# Patient Record
Sex: FEMALE | Race: WHITE
[De-identification: names, ages, dates, MRNs, and addresses within clinical notes are randomized per-mention and may not be internally consistent; named-entity substitution may affect disease eponyms.]

---
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
## 2022-01-07 ENCOUNTER — HOSPITAL ENCOUNTER (EMERGENCY)
Dept: HOSPITAL 35 - ER | Age: 22
Discharge: HOME | End: 2022-01-07
Payer: COMMERCIAL

## 2022-01-07 VITALS — DIASTOLIC BLOOD PRESSURE: 59 MMHG | SYSTOLIC BLOOD PRESSURE: 104 MMHG

## 2022-01-07 VITALS — HEIGHT: 62 IN | WEIGHT: 148 LBS | BODY MASS INDEX: 27.23 KG/M2

## 2022-01-07 DIAGNOSIS — Z20.822: ICD-10-CM

## 2022-01-07 DIAGNOSIS — Z79.899: ICD-10-CM

## 2022-01-07 DIAGNOSIS — Z91.09: ICD-10-CM

## 2022-01-07 DIAGNOSIS — Z79.891: ICD-10-CM

## 2022-01-07 DIAGNOSIS — I10: ICD-10-CM

## 2022-01-07 DIAGNOSIS — Z88.6: ICD-10-CM

## 2022-01-07 DIAGNOSIS — Z88.1: ICD-10-CM

## 2022-01-07 DIAGNOSIS — Z79.82: ICD-10-CM

## 2022-01-07 DIAGNOSIS — Z88.5: ICD-10-CM

## 2022-01-07 DIAGNOSIS — Z88.4: ICD-10-CM

## 2022-01-07 DIAGNOSIS — M54.9: ICD-10-CM

## 2022-01-07 DIAGNOSIS — F41.9: ICD-10-CM

## 2022-01-07 DIAGNOSIS — R10.817: Primary | ICD-10-CM

## 2022-01-07 DIAGNOSIS — Z88.8: ICD-10-CM

## 2022-01-07 DIAGNOSIS — Z86.718: ICD-10-CM

## 2022-01-07 DIAGNOSIS — K74.60: ICD-10-CM

## 2022-01-07 DIAGNOSIS — G62.9: ICD-10-CM

## 2022-01-07 DIAGNOSIS — I42.9: ICD-10-CM

## 2022-01-07 DIAGNOSIS — F32.9: ICD-10-CM

## 2022-01-07 DIAGNOSIS — E03.9: ICD-10-CM

## 2022-01-07 DIAGNOSIS — Z91.018: ICD-10-CM

## 2022-01-07 LAB
%HYPO/RBC NFR BLD AUTO: (no result) %
ALBUMIN SERPL-MCNC: 2.8 G/DL (ref 3.4–5)
ALT SERPL-CCNC: 33 U/L (ref 14–59)
ANION GAP SERPL CALC-SCNC: 9 MMOL/L (ref 7–16)
ANISOCYTOSIS BLD QL SMEAR: (no result)
AST SERPL-CCNC: 36 U/L (ref 15–37)
BASOPHILS NFR BLD AUTO: 1.6 % (ref 0–2)
BILIRUB SERPL-MCNC: 1.4 MG/DL (ref 0.2–1)
BILIRUB UR-MCNC: NEGATIVE MG/DL
BUN SERPL-MCNC: 24 MG/DL (ref 7–18)
CALCIUM SERPL-MCNC: 8.4 MG/DL (ref 8.5–10.1)
CHLORIDE SERPL-SCNC: 96 MMOL/L (ref 98–107)
CO2 SERPL-SCNC: 27 MMOL/L (ref 21–32)
COLOR UR: YELLOW
CREAT SERPL-MCNC: 1.1 MG/DL (ref 0.6–1)
D DIMER PPP FEU-MCNC: 5.25 UG/MLFEU (ref 0.19–0.5)
EOSINOPHIL NFR BLD: 2.8 % (ref 0–3)
ERYTHROCYTE [DISTWIDTH] IN BLOOD BY AUTOMATED COUNT: 24.6 % (ref 10.5–14.5)
GLUCOSE SERPL-MCNC: 143 MG/DL (ref 74–106)
GRANULOCYTES NFR BLD MANUAL: 77.7 % (ref 36–66)
HCT VFR BLD CALC: 37.3 % (ref 37–47)
HGB BLD-MCNC: 11.1 GM/DL (ref 12–15)
INR PPP: 1.61
KETONES UR STRIP-MCNC: NEGATIVE MG/DL
LYMPHOCYTES NFR BLD AUTO: 11.9 % (ref 24–44)
MCH RBC QN AUTO: 20.8 PG (ref 26–34)
MCHC RBC AUTO-ENTMCNC: 29.8 G/DL (ref 28–37)
MCV RBC: 69.8 FL (ref 80–100)
MICROCYTES: (no result)
MONOCYTES NFR BLD: 6 % (ref 1–8)
NEUTROPHILS # BLD: 5.8 THOU/UL (ref 1.4–8.2)
PLATELET # BLD EST: NORMAL 10*3/UL
PLATELET # BLD: 234 THOU/UL (ref 150–400)
POTASSIUM SERPL-SCNC: 3.2 MMOL/L (ref 3.5–5.1)
PROT SERPL-MCNC: 6.3 G/DL (ref 6.4–8.2)
PROTHROMBIN TIME: 17.2 SECONDS (ref 10.5–12.1)
RBC # BLD AUTO: 5.34 MIL/UL (ref 4.2–5)
RBC # UR STRIP: NEGATIVE /UL
SODIUM SERPL-SCNC: 132 MMOL/L (ref 136–145)
SP GR UR STRIP: 1.01 (ref 1–1.03)
URINE CLARITY: CLEAR
URINE GLUCOSE-RANDOM*: NEGATIVE
URINE LEUKOCYTES-REFLEX: (no result)
URINE NITRITE-REFLEX: NEGATIVE
URINE PROTEIN (DIPSTICK): NEGATIVE
UROBILINOGEN UR STRIP-ACNC: 0.2 E.U./DL (ref 0.2–1)
WBC # BLD AUTO: 7.5 THOU/UL (ref 4–11)

## 2022-01-10 NOTE — EKG
[address] [employer]
[place]  [address]
Phone:  [phone]                    ELECTROCARDIOGRAM REPORT      
_______________________________________________________________________________
 
Name:       SUSHILA SIMEONABELLA             [facility]                     [id number]     Account #:      [id number]  
Admission:  22    Attend Phys:                          
Discharge:  22    Date of Birth:  [birth year]  
                                                          Report #: [id number]
   [id number]
_______________________________________________________________________________
                         [facility] ED
                                       
Test Date:    2022               Test Time:    14:51:37
Pat Name:     SHAHNAZ SIMEON        Department:   
Patient ID:   [id number]            Room:          
Gender:       F                        Technician:   ROLO
:          2000               Requested By: Jonatan Miranda
Order Number: [id number] MD:   Trevon De La Rosa
                                 Measurements
Intervals                              Axis          
Rate:         63                       P:            26
TX:           241                      QRS:          82
QRSD:         104                      T:            90
QT:           573                                    
QTc:          587                                    
                           Interpretive Statements
Sinus rhythm
Prolonged TX interval
Biatrial enlargement
Abnormal Q suggests lateral infarct
Prolonged QT interval
Compared to ECG 2022 12:35:44
Atrial abnormality now present
T-wave abnormality now present
Possible ischemia now present
Prolonged QT interval now present
Ectopic atrial rhythm no longer present
Electronically Signed On 1- 7:34:02 CST by Trevon De La Rosa
[email or web address]
 
 
 
 
 
 
 
 
 
 
 
 
 
 
  <ELECTRONICALLY SIGNED>
   By: Trevon De La Rosa MD, FACC    
  01/10/22     0734
D: 22 1451                           _____________________________________
T: 22 1451                           Trevon De La Rosa MD, [facility]      /EPI

## 2022-01-10 NOTE — EKG
[place] [address] [address] [facility]
[place]  [address]
Phone:  [phone]                    ELECTROCARDIOGRAM REPORT      
_______________________________________________________________________________
 
Name:       SHAHNAZ SIMEON             [facility] #:                     [facility]      [id number]     Account #:      [id number]  
Admission:  22    Attend Phys:                          
Discharge:  22    Date of Birth:  [birth year]  
                                                          Report #: [id number]
   [id number]
_______________________________________________________________________________
                         [facility] ED
                                       
Test Date:    2022               Test Time:    12:35:44
Pat Name:     SHAHNAZ SIMEON        Department:   
Patient ID:   [id number]            Room:          
Gender:       F                        Technician:   VEL
:          2000               Requested By: Jonatan Miranda
Order Number: [id number] MD:   Trevon De La Rosa
                                 Measurements
Intervals                              Axis          
Rate:         65                       P:            154
NM:           241                      QRS:          90
QRSD:         105                      T:            113
QT:           545                                    
QTc:          567                                    
                           Interpretive Statements
Right and left arm electrode reversal, interpretation assumes no reversal
Sinus rhythm
Prolonged NM interval,Biatrial enlargement
Borderline right axis deviation
Nonspecific T abnrm, anterolateral leads,Prolonged QT interval
Compared to ECG 11/10/2021 13:11:06
 
Prolonged QT interval now present
Sinus rhythm no longer present
Atrial abnormality no longer present
Electronically Signed On 1- 7:33:48 CST by Trevon De La Rosa
[email or web address]
 
 
 
 
 
 
 
 
 
 
 
 
 
 
 
  <ELECTRONICALLY SIGNED>
   By: Trevon De La Rosa MD, FACC    
  01/10/22     0733
D: 22 1235                           _____________________________________
T: 22 1235                           Trevon De La Rosa MD, FAC      /EPI

## 2022-01-11 ENCOUNTER — HOSPITAL ENCOUNTER (EMERGENCY)
Dept: HOSPITAL 35 - ER | Age: 22
Discharge: HOME | End: 2022-01-11
Payer: COMMERCIAL

## 2022-01-11 VITALS — HEIGHT: 62 IN | BODY MASS INDEX: 27.23 KG/M2 | WEIGHT: 148 LBS

## 2022-01-11 VITALS — DIASTOLIC BLOOD PRESSURE: 71 MMHG | SYSTOLIC BLOOD PRESSURE: 110 MMHG

## 2022-01-11 DIAGNOSIS — Z79.82: ICD-10-CM

## 2022-01-11 DIAGNOSIS — Z79.899: ICD-10-CM

## 2022-01-11 DIAGNOSIS — Z88.5: ICD-10-CM

## 2022-01-11 DIAGNOSIS — I10: ICD-10-CM

## 2022-01-11 DIAGNOSIS — Z91.018: ICD-10-CM

## 2022-01-11 DIAGNOSIS — E03.9: ICD-10-CM

## 2022-01-11 DIAGNOSIS — F32.9: ICD-10-CM

## 2022-01-11 DIAGNOSIS — F11.20: ICD-10-CM

## 2022-01-11 DIAGNOSIS — F41.9: ICD-10-CM

## 2022-01-11 DIAGNOSIS — I42.9: ICD-10-CM

## 2022-01-11 DIAGNOSIS — K74.60: ICD-10-CM

## 2022-01-11 DIAGNOSIS — Z79.1: ICD-10-CM

## 2022-01-11 DIAGNOSIS — I25.10: ICD-10-CM

## 2022-01-11 DIAGNOSIS — Z86.718: ICD-10-CM

## 2022-01-11 DIAGNOSIS — Z91.041: ICD-10-CM

## 2022-01-11 DIAGNOSIS — Z88.1: ICD-10-CM

## 2022-01-11 DIAGNOSIS — G62.9: ICD-10-CM

## 2022-01-11 DIAGNOSIS — U07.1: Primary | ICD-10-CM

## 2022-01-11 DIAGNOSIS — Z88.6: ICD-10-CM

## 2022-01-11 DIAGNOSIS — Z88.8: ICD-10-CM

## 2022-01-11 DIAGNOSIS — Z88.4: ICD-10-CM

## 2022-01-11 LAB
%HYPO/RBC NFR BLD AUTO: (no result) %
ALBUMIN SERPL-MCNC: 2.7 G/DL (ref 3.4–5)
ALT SERPL-CCNC: 31 U/L (ref 30–65)
ANION GAP SERPL CALC-SCNC: 14 MMOL/L (ref 7–16)
ANISOCYTOSIS BLD QL SMEAR: (no result)
AST SERPL-CCNC: 44 U/L (ref 15–37)
BILIRUB SERPL-MCNC: 0.9 MG/DL (ref 0.2–1)
BUN SERPL-MCNC: 25 MG/DL (ref 7–18)
CALCIUM SERPL-MCNC: 8.6 MG/DL (ref 8.5–10.1)
CHLORIDE SERPL-SCNC: 99 MMOL/L (ref 98–107)
CO2 SERPL-SCNC: 24 MMOL/L (ref 21–32)
CREAT SERPL-MCNC: 0.8 MG/DL (ref 0.6–1)
EOSINOPHIL NFR BLD: 3 % (ref 0–3)
ERYTHROCYTE [DISTWIDTH] IN BLOOD BY AUTOMATED COUNT: 24.6 % (ref 10.5–14.5)
GLUCOSE SERPL-MCNC: 115 MG/DL (ref 74–106)
GRANULOCYTES NFR BLD MANUAL: 73 % (ref 36–66)
HCT VFR BLD CALC: 41.6 % (ref 37–47)
HGB BLD-MCNC: 12.3 GM/DL (ref 12–15)
LYMPHOCYTES NFR BLD AUTO: 11 % (ref 24–44)
MCH RBC QN AUTO: 20.7 PG (ref 26–34)
MCHC RBC AUTO-ENTMCNC: 29.6 G/DL (ref 28–37)
MCV RBC: 69.9 FL (ref 80–100)
MICROCYTES: (no result)
MONOCYTES NFR BLD: 10 % (ref 1–8)
NEUTROPHILS # BLD: 6.9 THOU/UL (ref 1.4–8.2)
PLATELET # BLD: 313 THOU/UL (ref 150–400)
POTASSIUM SERPL-SCNC: 3.3 MMOL/L (ref 3.5–5.1)
PROT SERPL-MCNC: 6.3 G/DL (ref 6.4–8.2)
RBC # BLD AUTO: 5.96 MIL/UL (ref 4.2–5)
SODIUM SERPL-SCNC: 137 MMOL/L (ref 136–145)
TARGETS BLD QL SMEAR: (no result)
VARIANT LYMPHS NFR BLD MANUAL: 3 %
WBC # BLD AUTO: 9.4 THOU/UL (ref 4–11)

## 2022-01-12 ENCOUNTER — HOSPITAL ENCOUNTER (EMERGENCY)
Dept: HOSPITAL 35 - ER | Age: 22
Discharge: HOME | End: 2022-01-12
Payer: COMMERCIAL

## 2022-01-12 VITALS — WEIGHT: 148 LBS | BODY MASS INDEX: 27.23 KG/M2 | HEIGHT: 62 IN

## 2022-01-12 VITALS — DIASTOLIC BLOOD PRESSURE: 68 MMHG | SYSTOLIC BLOOD PRESSURE: 108 MMHG

## 2022-01-12 DIAGNOSIS — Z79.899: ICD-10-CM

## 2022-01-12 DIAGNOSIS — E03.9: ICD-10-CM

## 2022-01-12 DIAGNOSIS — F41.9: ICD-10-CM

## 2022-01-12 DIAGNOSIS — I10: ICD-10-CM

## 2022-01-12 DIAGNOSIS — U07.1: Primary | ICD-10-CM

## 2022-01-12 DIAGNOSIS — Z88.8: ICD-10-CM

## 2022-01-12 DIAGNOSIS — Z88.1: ICD-10-CM

## 2022-01-12 DIAGNOSIS — F32.9: ICD-10-CM

## 2022-01-12 LAB
%HYPO/RBC NFR BLD AUTO: (no result) %
ANION GAP SERPL CALC-SCNC: 12 MMOL/L (ref 7–16)
ANISOCYTOSIS BLD QL SMEAR: (no result)
BASOPHILS NFR BLD AUTO: 0.1 % (ref 0–2)
BUN SERPL-MCNC: 24 MG/DL (ref 7–18)
CALCIUM SERPL-MCNC: 8.8 MG/DL (ref 8.5–10.1)
CHLORIDE SERPL-SCNC: 97 MMOL/L (ref 98–107)
CO2 SERPL-SCNC: 27 MMOL/L (ref 21–32)
CREAT SERPL-MCNC: 0.9 MG/DL (ref 0.6–1)
EOSINOPHIL NFR BLD: 3 % (ref 0–3)
ERYTHROCYTE [DISTWIDTH] IN BLOOD BY AUTOMATED COUNT: 23.9 % (ref 10.5–14.5)
GLUCOSE SERPL-MCNC: 102 MG/DL (ref 74–106)
GRANULOCYTES NFR BLD MANUAL: 77.3 % (ref 36–66)
HCT VFR BLD CALC: 38.6 % (ref 37–47)
HGB BLD-MCNC: 11.4 GM/DL (ref 12–15)
LYMPHOCYTES NFR BLD AUTO: 10.6 % (ref 24–44)
MCH RBC QN AUTO: 20.5 PG (ref 26–34)
MCHC RBC AUTO-ENTMCNC: 29.5 G/DL (ref 28–37)
MCV RBC: 69.5 FL (ref 80–100)
MICROCYTES: (no result)
MONOCYTES NFR BLD: 9 % (ref 1–8)
NEUTROPHILS # BLD: 6.7 THOU/UL (ref 1.4–8.2)
OVALOCYTES BLD QL SMEAR: (no result)
PLATELET # BLD: 294 THOU/UL (ref 150–400)
POIKILOCYTOSIS BLD QL SMEAR: (no result)
POLYCHROMASIA BLD QL SMEAR: SLIGHT
POTASSIUM SERPL-SCNC: 3 MMOL/L (ref 3.5–5.1)
RBC # BLD AUTO: 5.55 MIL/UL (ref 4.2–5)
SODIUM SERPL-SCNC: 136 MMOL/L (ref 136–145)
WBC # BLD AUTO: 8.7 THOU/UL (ref 4–11)

## 2022-01-12 NOTE — EKG
[place] [address] [employer]
[place]  [address]
Phone:  [phone]                    ELECTROCARDIOGRAM REPORT      
_______________________________________________________________________________
 
Name:       HILLARY SIMEONLLA             Room #:                     [facility]#:      [id number]     Account #:      [id number]  
Admission:  22    Attend Phys:                          
Discharge:              Date of Birth:  [birth year]  
                                                          Report #: [id number]
   [id number]
_______________________________________________________________________________
                         [facility] ED
                                       
Test Date:    2022               Test Time:    07:25:24
Pat Name:     SHAHNAZ SIMEON        Department:   
Patient ID:   [id number]            Room:          
Gender:       F                        Technician:   VINICIO
:          2000               Requested By: Manpreet Cotter
Order Number: [id number] MD:   Trevon De La Rosa
                                 Measurements
Intervals                              Axis          
Rate:         63                       P:            28
MI:           250                      QRS:          83
QRSD:         112                      T:            61
QT:           543                                    
QTc:          557                                    
                           Interpretive Statements
Sinus rhythm
Prolonged MI interval
Biatrial enlargement
Borderline intraventricular conduction delay
Abnormal T, consider ischemia, anterior leads
Prolonged QT interval
Compared to ECG 2022 12:33:00
Possible ischemia now present
T-wave abnormality still present
Electronically Signed On 2022 11:17:35 CST by Trevon De La Rosa
[email or web address]
 
 
 
 
 
 
 
 
 
 
 
 
 
 
 
 
  <ELECTRONICALLY SIGNED>
   By: Trevon De La Rosa MD, [facility]    
  22     1117
D: 22                           _____________________________________
T: 22                           Trevon De La Rosa MD, [facility]      /EPI
none